# Patient Record
Sex: FEMALE | Race: WHITE | HISPANIC OR LATINO | URBAN - METROPOLITAN AREA
[De-identification: names, ages, dates, MRNs, and addresses within clinical notes are randomized per-mention and may not be internally consistent; named-entity substitution may affect disease eponyms.]

---

## 2018-12-13 ENCOUNTER — OFFICE VISIT CONVERTED (OUTPATIENT)
Dept: PODIATRY | Facility: CLINIC | Age: 18
End: 2018-12-13
Attending: PODIATRIST

## 2019-01-04 ENCOUNTER — OFFICE VISIT CONVERTED (OUTPATIENT)
Dept: PODIATRY | Facility: CLINIC | Age: 19
End: 2019-01-04
Attending: PODIATRIST

## 2019-02-20 ENCOUNTER — OFFICE VISIT CONVERTED (OUTPATIENT)
Dept: PODIATRY | Facility: CLINIC | Age: 19
End: 2019-02-20
Attending: PODIATRIST

## 2019-08-24 ENCOUNTER — HOSPITAL ENCOUNTER (OUTPATIENT)
Dept: URGENT CARE | Facility: CLINIC | Age: 19
Discharge: HOME | End: 2019-08-24

## 2019-08-26 LAB — BACTERIA UR CULT: NORMAL

## 2020-01-14 ENCOUNTER — HOSPITAL ENCOUNTER (OUTPATIENT)
Dept: LABOR AND DELIVERY | Facility: HOSPITAL | Age: 20
Discharge: HOME | End: 2020-01-14
Attending: SPECIALIST

## 2020-08-18 ENCOUNTER — OFFICE VISIT CONVERTED (OUTPATIENT)
Dept: OTOLARYNGOLOGY | Facility: CLINIC | Age: 20
End: 2020-08-18
Attending: NURSE PRACTITIONER

## 2021-05-10 NOTE — H&P
History and Physical      Patient Name: Kim Deras   Patient ID: [id number]   Sex: Female   YOB: 2000    Primary Care Provider: Cy Donohue MD   Referring Provider: Cy Donohue MD    Visit Date: August 18, 2020    Provider: TRISHA Mckeon   Location: [facility]   Location Address: [address] [place] [address], [address] [place] [address]  [id number]   Location Phone: [phone]          Chief Complaint     1.  Nasal trauma    2.  Epistaxis    3.  Allergic rhinitis       History Of Present Illness  Kim Deras is a 19 year old /White,  or  female who presents to the office today as a consult from Cy Donohue MD.      She presents to the clinic today for evaluation of her nose.  She reports that approximately 10 days ago she was head butted in the nose by her baby which caused a nosebleed.  She did have x-rays done at [place] which she assumed were negative for any fracture although she did not get a report.  We do not have copies of these x-rays.  She reports that since that time she has had 2 subsequent left-sided nosebleeds that were short in duration.  She feels like she is not breathing well out of her nose.  She does report clear rhinitis and a longstanding history with seasonal allergies.  However, at this time she is breast-feeding so she is not taking any oral antihistamines.  She does state that she played soccer for many years and was hit in the nose multiple times.  She feels like she has a bump on the right side of her nose from a previous soccer injury.  She does have her septum pierced but reports that she has had no issues with any infections from this piercing.       Past Medical History  Epistaxis; Foot pain, left; Foot pain, right; Ingrown toenail; Onychia and paronychia of toe         Past Surgical History  [place] Tooth Extraction         Allergy List  NO KNOWN DRUG ALLERGIES         Family Medical History  Family history of diabetes mellitus;  "Family history of melanoma         Social History  Alcohol (Current some day); Tobacco (Never)         Review of Systems  · Constitutional  o Denies  o : fever, night sweats, weight loss  · Eyes  o Denies  o : discharge from eye, impaired vision  · HENT  o Admits  o : *See HPI  · Cardiovascular  o Denies  o : chest pain, irregular heart beats  · Respiratory  o Denies  o : shortness of breath, wheezing, coughing up blood  · Gastrointestinal  o Denies  o : heartburn, reflux, vomiting blood  · Genitourinary  o Denies  o : frequency  · Integument  o Denies  o : rash, skin dryness  · Neurologic  o Denies  o : seizures, loss of balance, loss of consciousness, dizziness  · Endocrine  o Denies  o : cold intolerance, heat intolerance  · Heme-Lymph  o Denies  o : easy bleeding, anemia      Vitals  Date Time BP Position Site L\R Cuff Size HR RR TEMP (F) WT  HT  BMI kg/m2 BSA m2 O2 Sat HC       08/18/2020 01:21 PM        97.7 152lbs 8oz 5'  6\" 24.61 1.79           Physical Examination  · Constitutional  o Appearance  o : well developed, well-nourished, alert and in no acute distress, voice clear and strong  · Head and Face  o Head  o :   § Inspection  § : no deformities or lesions  o Face  o :   § Inspection  § : No facial lesions; House-Brackmann I/VI bilaterally  § Palpation  § : No TMJ crepitus nor  muscle tenderness bilaterally  · Eyes  o Vision  o :   § Visual Fields  § : Extraocular movements are intact. No spontaneous or gaze-induced nystagmus.  o Conjunctivae  o : clear  o Sclerae  o : clear  o Pupils and Irises  o : pupils equal, round, and reactive to light.   · Ears, Nose, Mouth and Throat  o Ears  o :   § External Ears  § : appearance within normal limits, no lesions present  § Otoscopic Examination  § : tympanic membrane appearance within normal limits bilaterally without perforations, well-aerated middle ears  § Hearing  § : intact to conversational voice both ears  o Nose  o :   § External Nose  § : " appearance normal, no nasal step-offs  § Intranasal Exam  § : mucosa within normal limits, vestibules normal, no intranasal lesions present, septum midline, sinuses non tender to percussion  o Oral Cavity  o :   § Oral Mucosa  § : oral mucosa normal without pallor or cyanosis  § Lips  § : lip appearance normal  § Teeth  § : normal dentition for age  § Gums  § : gums pink, non-swollen, no bleeding present  § Tongue  § : tongue appearance normal; normal mobility  § Palate  § : hard palate normal, soft palate appearance normal with symmetric mobility  o Throat  o :   § Oropharynx  § : no inflammation or lesions present, tonsils within normal limits  · Neck  o Inspection/Palpation  o : normal appearance, no masses or tenderness, trachea midline; thyroid size normal, nontender, no nodules or masses present on palpation  · Respiratory  o Respiratory Effort  o : breathing unlabored  o Inspection of Chest  o : normal appearance, no retractions  · Lymphatic  o Neck  o : no lymphadenopathy present  o Supraclavicular Nodes  o : no lymphadenopathy present  o Preauricular Nodes  o : no lymphadenopathy present  · Skin and Subcutaneous Tissue  o General Inspection  o : Regarding face and neck - there are no rashes present, no lesions present, and no areas of discoloration  · Neurologic  o Cranial Nerves  o : cranial nerves II-XII are grossly intact bilaterally  o Gait and Station  o : normal gait, able to stand without diffculty  · Psychiatric  o Judgement and Insight  o : judgment and insight intact  o Mood and Affect  o : mood normal, affect appropriate              Assessment  · Allergic rhinitis     477.9/J30.9  · Epistaxis     784.7/R04.0  · Nasal trauma     959.09/S09.92XA    Problems Reconciled  Plan  · Medications  o fluticasone propionate 50 mcg/actuation nasal spray,suspension   SIG: spray 1 - 2 sprays (50 - 100 mcg) in each nostril by intranasal route once daily as needed for 30 days   DISP: (1) 9.9 ml aer w/adap with 5  refills  Prescribed on 08/18/2020     o Medications have been Reconciled  o Transition of Care or Provider Policy  · Instructions  o She presents to the clinic today for evaluation of her nose. She reports that approximately 10 days ago she was head butted in the nose by her baby which caused a nosebleed. She did have x-rays done at [place] which she assumed were negative for any fracture although she did not get a report. We do not have copies of these x-rays. She reports that since that time she has had 2 subsequent left-sided nosebleeds that were short in duration. She feels like she is not breathing well out of her nose. She does report clear rhinitis and a longstanding history with seasonal allergies. However, at this time she is breast-feeding so she is not taking any oral antihistamines. She does state that she played soccer for many years and was hit in the nose multiple times. She feels like she has a bump on the right side of her nose from a previous soccer injury. She does have her septum pierced but reports that she has had no issues with any infections from this piercing. On examination, her external appearance has a normal appearance with no palpable nasal step offs. The nasal mucosa is normal in appearance. There are no lesions or prominent appearing blood vessels. Her septum piercing appears to have no signs of inflammation or infection. Her septum is midline and her inferior turbinates are not inflamed or causing any obstruction. Negative trisha maneuver bilaterally. She does have some clear rhinitis present. I will have her start using nasal saline gel to help moisten the nasal mucosa and will also start her on fluticasone spray to help with her allergic rhinitis symptoms as this can safely be used with breast-feeding. I will plan to see her back on an as-needed basis.  o Electronically Identified Patient Education Materials Provided Electronically  · Referrals  o ID: [id number] Date: 08/17/2020  Type: Inbound  Specialty: Otolaryngology            Electronically Signed by: TRISHA Mckeon -Author on August 18, 2020 01:40:06 PM

## 2021-05-14 VITALS — TEMPERATURE: 97.7 F | BODY MASS INDEX: 24.51 KG/M2 | HEIGHT: 66 IN | WEIGHT: 152.5 LBS

## 2021-05-15 VITALS
WEIGHT: 153 LBS | OXYGEN SATURATION: 97 % | SYSTOLIC BLOOD PRESSURE: 109 MMHG | HEART RATE: 60 BPM | BODY MASS INDEX: 24.59 KG/M2 | DIASTOLIC BLOOD PRESSURE: 72 MMHG | HEIGHT: 66 IN

## 2021-05-15 VITALS
WEIGHT: 151 LBS | DIASTOLIC BLOOD PRESSURE: 61 MMHG | HEART RATE: 99 BPM | BODY MASS INDEX: 24.27 KG/M2 | HEIGHT: 66 IN | OXYGEN SATURATION: 99 % | SYSTOLIC BLOOD PRESSURE: 110 MMHG

## 2021-05-15 VITALS
BODY MASS INDEX: 24.27 KG/M2 | OXYGEN SATURATION: 99 % | SYSTOLIC BLOOD PRESSURE: 105 MMHG | WEIGHT: 151 LBS | HEART RATE: 76 BPM | HEIGHT: 66 IN | DIASTOLIC BLOOD PRESSURE: 64 MMHG